# Patient Record
Sex: MALE | Race: WHITE | URBAN - METROPOLITAN AREA
[De-identification: names, ages, dates, MRNs, and addresses within clinical notes are randomized per-mention and may not be internally consistent; named-entity substitution may affect disease eponyms.]

---

## 2017-08-02 ENCOUNTER — HOSPITAL ENCOUNTER (EMERGENCY)
Facility: CLINIC | Age: 37
Discharge: HOME | End: 2017-08-02
Attending: EMERGENCY MEDICINE | Admitting: EMERGENCY MEDICINE
Payer: COMMERCIAL

## 2017-08-02 VITALS
SYSTOLIC BLOOD PRESSURE: 145 MMHG | BODY MASS INDEX: 33.04 KG/M2 | WEIGHT: 236 LBS | TEMPERATURE: 98.4 F | RESPIRATION RATE: 16 BRPM | OXYGEN SATURATION: 99 % | HEIGHT: 71 IN | DIASTOLIC BLOOD PRESSURE: 86 MMHG | HEART RATE: 74 BPM

## 2017-08-02 DIAGNOSIS — L03.119 CELLULITIS AND ABSCESS OF LEG, EXCEPT FOOT: ICD-10-CM

## 2017-08-02 DIAGNOSIS — L02.419 CELLULITIS AND ABSCESS OF LEG, EXCEPT FOOT: ICD-10-CM

## 2017-08-02 PROCEDURE — 10060 I&D ABSCESS SIMPLE/SINGLE: CPT

## 2017-08-02 PROCEDURE — 87186 SC STD MICRODIL/AGAR DIL: CPT | Performed by: EMERGENCY MEDICINE

## 2017-08-02 PROCEDURE — 87077 CULTURE AEROBIC IDENTIFY: CPT | Performed by: EMERGENCY MEDICINE

## 2017-08-02 PROCEDURE — 87070 CULTURE OTHR SPECIMN AEROBIC: CPT | Performed by: EMERGENCY MEDICINE

## 2017-08-02 PROCEDURE — 99283 EMERGENCY DEPT VISIT LOW MDM: CPT | Mod: 25

## 2017-08-02 RX ORDER — CEPHALEXIN 500 MG/1
500 CAPSULE ORAL 4 TIMES DAILY
Qty: 28 CAPSULE | Refills: 0 | Status: SHIPPED | OUTPATIENT
Start: 2017-08-02 | End: 2017-08-09

## 2017-08-02 ASSESSMENT — ENCOUNTER SYMPTOMS
CHILLS: 0
COLOR CHANGE: 1
FEVER: 0

## 2017-08-02 NOTE — ED AVS SNAPSHOT
Emergency Department    [address] [place] [address]    Phone:  [phone]    Fax:  [phone]                                       Dwight Tompkins   MRN: [id number]    Department:   Emergency Department   Date of Visit:  8/2/2017           After Visit Summary Signature Page     I have received my discharge instructions, and my questions have been answered. I have discussed any challenges I see with this plan with the nurse or doctor.    ..........................................................................................................................................  Patient/Patient Representative Signature      ..........................................................................................................................................  Patient Representative Print Name and Relationship to Patient    ..................................................               ................................................  Date                                            Time    ..........................................................................................................................................  Reviewed by Signature/Title    ...................................................              ..............................................  Date                                                            Time

## 2017-08-02 NOTE — ED AVS SNAPSHOT
Emergency Department    [address] [place] [address]    Phone:  [phone]    Fax:  [phone]                                       Dwight Tompkins   MRN: [id number]    Department:   Emergency Department   Date of Visit:  8/2/2017           Patient Information     Date Of Birth          1980        Your diagnoses for this visit were:     Cellulitis and abscess of leg, except foot        You were seen by Gilmar Guerrero MD.      Follow-up Information     Follow up with your doctor.        Discharge Instructions         Abscess (Incision & Drainage)  An abscess is sometimes called a boil. It happens when bacteria get trapped under the skin and start to grow. Pus forms inside the abscess as the body responds to the bacteria. An abscess can happen with an insect bite, ingrown hair, blocked oil gland, pimple, cyst, or puncture wound.  Your healthcare provider has drained the pus from your abscess. If the abscess pocket was large, your healthcare provider may have put in gauze packing. Your provider will need to remove it on your next visit. He or she may also replace it at that time. You may not need antibiotics to treat a simple abscess, unless the infection is spreading into the skin around the wound (cellulitis).  The wound will take about 1 to 2 weeks to heal, depending on the size of the abscess. Healthy tissue will grow from the bottom and sides of the opening until it seals over.  Home care  These tips can help your wound heal:    The wound may drain for the first 2 days. Cover the wound with a clean dry dressing. Change the dressing if it becomes soaked with blood or pus.    If a gauze packing was placed inside the abscess pocket, you may be told to remove it yourself. You may do this in the shower. Once the packing is removed, you should wash the area in the shower, or clean the area as directed by your provider. Continue to do this until the skin opening has closed. Make  sure you wash your hands after changing the packing or cleaning the wound.    If you were prescribed antibiotics, take them as directed until they are all gone.    You may use acetaminophen or ibuprofen to control pain, unless another pain medicine was prescribed. If you have liver disease or ever had a stomach ulcer, talk with your doctor before using these medicines.  Follow-up care  Follow up with your healthcare provider, or as advised. If a gauze packing was put in your wound, it should be removed in 1 to 2 days. Check your wound every day for any signs that the infection is getting worse. The signs are listed below.  When to seek medical advice  Call your healthcare provider right away if any of these occur:    Increasing redness or swelling    Red streaks in the skin leading away from the wound    Increasing local pain or swelling    Continued pus draining from the wound 2 days after treatment    Fever of 100.4 F (38 C) or higher, or as directed by your healthcare provider    Boil returns when you are at home  Date Last Reviewed: 9/1/2016 2000-2017 [employer]. [address] [place] [address]. All rights reserved. This information is not intended as a substitute for professional medical care. Always follow your healthcare professional's instructions.          24 Hour Appointment Hotline       To make an appointment at any [facility], call [phone] ([phone]). If you don't have a family doctor or clinic, we will help you find one. [facility] clinics are conveniently located to serve the needs of you and your family.             Review of your medicines      Notice     You have not been prescribed any medications.            Procedures and tests performed during your visit     Wound Culture Aerobic Bacterial      Orders Needing Specimen Collection     None      Pending Results     Date and Time Order Name Status Description    8/2/2017 0322 Wound Culture Aerobic  Bacterial In process             Pending Culture Results     Date and Time Order Name Status Description    8/2/2017 0322 Wound Culture Aerobic Bacterial In process             Pending Results Instructions     If you had any lab results that were not finalized at the time of your Discharge, you can call the ED Lab Result RN at [phone]. You will be contacted by this team for any positive Lab results or changes in treatment. The nurses are available 7 days a week from 10A to 6:30P.  You can leave a message 24 hours per day and they will return your call.        Test Results From Your Hospital Stay        8/2/2017  3:29 AM                Clinical Quality Measure: Blood Pressure Screening     Your blood pressure was checked while you were in the emergency department today. The last reading we obtained was  BP: 145/86 . Please read the guidelines below about what these numbers mean and what you should do about them.  If your systolic blood pressure (the top number) is less than 120 and your diastolic blood pressure (the bottom number) is less than 80, then your blood pressure is normal. There is nothing more that you need to do about it.  If your systolic blood pressure (the top number) is 120-139 or your diastolic blood pressure (the bottom number) is 80-89, your blood pressure may be higher than it should be. You should have your blood pressure rechecked within a year by a primary care provider.  If your systolic blood pressure (the top number) is 140 or greater or your diastolic blood pressure (the bottom number) is 90 or greater, you may have high blood pressure. High blood pressure is treatable, but if left untreated over time it can put you at risk for heart attack, stroke, or kidney failure. You should have your blood pressure rechecked by a primary care provider within the next 4 weeks.  If your provider in the emergency department today gave you specific instructions to follow-up with your doctor or  "provider even sooner than that, you should follow that instruction and not wait for up to 4 weeks for your follow-up visit.        Thank you for choosing [facility]       Thank you for choosing [facility] for your care. Our goal is always to provide you with excellent care. Hearing back from our patients is one way we can continue to improve our services. Please take a few minutes to complete the written survey that you may receive in the mail after you visit with us. Thank you!        [employer] Information     [employer] lets you send messages to your doctor, view your test results, renew your prescriptions, schedule appointments and more. To sign up, go to [email or web address] . Click on \"Log in\" on the left side of the screen, which will take you to the Welcome page. Then click on \"Sign up Now\" on the right side of the page.     You will be asked to enter the access code listed below, as well as some personal information. Please follow the directions to create your username and password.     Your access code is: [email or web address]  Expires: 10/31/2017  3:29 AM     Your access code will  in 90 days. If you need help or a new code, please call your [facility] clinic or [phone].        Care EveryWhere ID     This is your Care EveryWhere ID. This could be used by other organizations to access your [facility] medical records  [id number]        Equal Access to Services     FAUSTINO GILMORE AH: Hadii dago Antoine, waaxda luqadaha, qaybta kaalmada elinor, lala santamaria . So [facility] [phone].    ATENCIÓN: Si habla español, tiene a nelson disposición servicios gratuitos de asistencia lingüística. Nakul al [phone].    We comply with applicable federal civil rights laws and [place] laws. We do not discriminate on the basis of race, color, national origin, age, disability sex, sexual orientation or gender identity.            After Visit Summary       This is your record. Keep this with you " and show to your community pharmacist(s) and doctor(s) at your next visit.

## 2017-08-02 NOTE — LETTER
To Whom it may concern:      Dwight Tompkins was seen in our Emergency Department today, 08/02/17.  I expect his condition to improve over the next 2 days.  he may return to work/school when improved.    Sincerely,  Gilmar Guerrero MD

## 2017-08-02 NOTE — DISCHARGE INSTRUCTIONS
Abscess (Incision & Drainage)  An abscess is sometimes called a boil. It happens when bacteria get trapped under the skin and start to grow. Pus forms inside the abscess as the body responds to the bacteria. An abscess can happen with an insect bite, ingrown hair, blocked oil gland, pimple, cyst, or puncture wound.  Your healthcare provider has drained the pus from your abscess. If the abscess pocket was large, your healthcare provider may have put in gauze packing. Your provider will need to remove it on your next visit. He or she may also replace it at that time. You may not need antibiotics to treat a simple abscess, unless the infection is spreading into the skin around the wound (cellulitis).  The wound will take about 1 to 2 weeks to heal, depending on the size of the abscess. Healthy tissue will grow from the bottom and sides of the opening until it seals over.  Home care  These tips can help your wound heal:    The wound may drain for the first 2 days. Cover the wound with a clean dry dressing. Change the dressing if it becomes soaked with blood or pus.    If a gauze packing was placed inside the abscess pocket, you may be told to remove it yourself. You may do this in the shower. Once the packing is removed, you should wash the area in the shower, or clean the area as directed by your provider. Continue to do this until the skin opening has closed. Make sure you wash your hands after changing the packing or cleaning the wound.    If you were prescribed antibiotics, take them as directed until they are all gone.    You may use acetaminophen or ibuprofen to control pain, unless another pain medicine was prescribed. If you have liver disease or ever had a stomach ulcer, talk with your doctor before using these medicines.  Follow-up care  Follow up with your healthcare provider, or as advised. If a gauze packing was put in your wound, it should be removed in 1 to 2 days. Check your wound every day for any  signs that the infection is getting worse. The signs are listed below.  When to seek medical advice  Call your healthcare provider right away if any of these occur:    Increasing redness or swelling    Red streaks in the skin leading away from the wound    Increasing local pain or swelling    Continued pus draining from the wound 2 days after treatment    Fever of 100.4 F (38 C) or higher, or as directed by your healthcare provider    Boil returns when you are at home  Date Last Reviewed: 9/1/2016 2000-2017 [employer]. [address] [place] [address], [place] [id number]. All rights reserved. This information is not intended as a substitute for professional medical care. Always follow your healthcare professional's instructions.

## 2017-08-02 NOTE — ED PROVIDER NOTES
"  History     Chief Complaint:  Wound Check (wound to right buttocks, back of left calf. says started as white head or infected hair folicle. was seen at urgent care Monday after work and given antibiotics at [facility]. given bactrim and bactroban ointment. says pain is getting worse and he cant sleep)    MARY BETH   Dwight Tompkins is a 37 year old male who presents for a wound check. The patient reports that he was seen at urgent care on Monday and put on antibiotics for two abscesses that began as white heads or infected hair follicles on his right buttock and behind his left knee. He was given bactrim and Bactroban ointment. He states that his pain is getting worse, not better, and that he cannot sleep due to the pain. The patient does have a history of similar abscesses that have had to be drained in the past.    Allergies:  No Known Drug Allergies    Medications:    The patient is not currently taking any prescribed medications.    Past Medical History:    The patient denies any relevant past medical history.    Past Surgical History:    History reviewed. No pertinent past surgical history.    Family History:    The patient denies any relevant family medical history.    Social History:  Smoking Status: No  Smokeless Tobacco: No  Alcohol Use: No  Marital Status:  N/A     Review of Systems   Constitutional: Negative for chills and fever.   Skin: Positive for color change.        Abscess to the right buttock and behind the left knee   All other systems reviewed and are negative.    Physical Exam   Vitals:  Patient Vitals for the past 24 hrs:   BP Temp Temp src Pulse Resp SpO2 Height Weight   08/02/17 0215 145/86 98.4  F (36.9  C) Oral 74 16 99 % 1.803 m (5' 11\") 107 kg (236 lb)      Physical Exam  Constitutional: The patient is oriented to person, place, and time.   HENT:   Head: Atraumatic  Right Ear: Normal  Left Ear: Normal  Nose: Nose normal.   Mouth/Throat: Oropharynx is clear and moist. No erythema or " exudate.   Eyes: Conjunctivae and EOM are normal. Pupils are equal, round, and reactive to light. No discharge  Neck: Normal range of motion. Neck supple.   Cardiovascular: Normal rate, regular rhythm, no murmur gallops or rubs. Intact distal pulses.    Pulmonary/Chest: CTA bilaterally. No wheezes rale or rhonchi.  Abdominal: Soft. Non tender.  No masses   Musculoskeletal: No edema. No bony deformity. Normal range of motion  Lymphadenopathy:     The patient has no cervical adenopathy.   Neurological: The patient is alert and oriented to person, place, and time. The patient has normal strength and normal reflexes. No cranial nerve deficit. Coordination normal.  Skin: Skin is warm and dry. No rash noted. The patient is not diaphoretic. 6 cm area of erythema and induration on lateral right hip, no active drainage, tender with palpitation. 2 cm area of erythema and induration on back of left calf, no fluctuance, minimally tender  Psychiatric: The patient has a normal mood and affect.    Emergency Department Course     Laboratory:  Laboratory findings were communicated with the patient who voiced understanding of the findings.  Wound culture aerobic bacterial: Pending    Procedures:      Procedure: Incision and Drainage     LOCATIONS:  Right buttock    ANESTHESIA:  Local field block using and Marcaine 0.5% with epinephrine, total of 3 mLs    PREPARATION:  Cleansed with Normal Saline    PROCEDURE:  Area was incised with # 11 Blade ([place]) with a Single Straight incision.  Wound treatment included Deloculation, purulent drainage.  Packing consisted of Plain Gauze.  Appropriate dressing was applied to cover the area.    Patient Status: Patient tolerated the procedure well. There were no complications.      Emergency Department Course:  Nursing notes and vitals reviewed.  0240 I had my initial encounter with the patient.  I performed an exam of the patient as documented above.     I discussed the treatment plan with  the patient. They expressed understanding of this plan and consented to discharge. They will be discharged home with instructions for care and follow up. In addition, the patient will return to the emergency department if their symptoms persist, worsen, if new symptoms arise or if there is any concern.  All questions were answered.    I personally reviewed the laboratory results with the Patient and answered all related questions prior to discharge.    Impression & Plan      Medical Decision Making:  Dwight Tompkins is a 37 year old male who presents to the emergency department today with concerns about swelling wound on his right hip and a secondary wound on the back of his left calf The patient has a large, approximately 6 cm diameter of erythema and induration on his right hip consistent with abscess. He has a smaller about 2 cm diameter of induration on the back of his let calf without any fluctuant or evidence of jada abscess. Note the patient was seen at Urgent care a day ago and started on Bactrim which he has had a total of 3 doses but thinks things are getting worse. In light of this I thought an incision and drainage was indicated as per the above procedure note. Drained a small amount of purulent material. Loculations were broken up with forceps and a quarter inch packing was applied. I will ad Keflex to his antibiotic regimen and have recommended 3x days Bactroban ointment and warm packs. The spot on the back of his left I did not feel represented a jada abscess and did not need to be drained at this time, but should continue the antibiotic ointment and warm pack unless things change. He should return for problems.    Diagnosis:    ICD-10-CM    1. Cellulitis and abscess of leg, except foot L03.119 Wound Culture Aerobic Bacterial    L02.419      Disposition:   Discharge    Discharge Medications:  New Prescriptions    CEPHALEXIN (KEFLEX) 500 MG CAPSULE    Take 1 capsule (500 mg) by mouth 4 times daily  for 7 days     Scribe Disclosure:  I, Tao England, am serving as a scribe at 2:44 AM on 8/2/2017 to document services personally performed by Gilmar Guerrero MD, based on my observations and the provider's statements to me.    8/2/2017    EMERGENCY DEPARTMENT       Gilmar Guerrero MD  08/02/17 0654

## 2017-08-04 ENCOUNTER — TELEPHONE (OUTPATIENT)
Dept: EMERGENCY MEDICINE | Facility: CLINIC | Age: 37
End: 2017-08-04

## 2017-08-04 DIAGNOSIS — A49.02 MRSA INFECTION: ICD-10-CM

## 2017-08-04 LAB
BACTERIA SPEC CULT: ABNORMAL
Lab: ABNORMAL
MICRO REPORT STATUS: ABNORMAL
MICROORGANISM SPEC CULT: ABNORMAL
SPECIMEN SOURCE: ABNORMAL

## 2017-08-04 RX ORDER — DOXYCYCLINE 100 MG/1
100 CAPSULE ORAL 2 TIMES DAILY
Qty: 20 CAPSULE | Refills: 0 | Status: SHIPPED | OUTPATIENT
Start: 2017-08-04 | End: 2017-08-14

## 2017-08-04 NOTE — TELEPHONE ENCOUNTER
[facility] Emergency Department Lab result notification [Adult-Male]    [facility] ED lab result protocol used  General Culture    Reason for call  Notify of lab results, assess symptoms,  review ED providers recommendations/discharge instructions (if necessary) and advise per ED lab result f/u protocol    Lab Result (including Rx patient on, if applicable)  Final Wound culture report on 8/4/17  [place] ED discharge antibiotic: Cephalexin (Keflex) 500 mg capsule, Take 1 capsule (500 mg) by mouth 4 times daily for 7 days in addition to Bactroban and Bactrim started in  on 7/31/17.  #1. Bacteria,Heavy growth Methicillin resistant Staphylococcus aureus (MRSA), which is [RESISTANT] to ED discharge antibiotics.  Incision and Drainage performed in [facility] ED [Yes / No]: Yes  Patient to be notified of result, symptoms's assessed and advised per [place] ED lab result protocol.    Information table from ED Provider visit on 8/2/17  ED diagnosis:   Cellulitis and abscess of leg, except foot    ED provider   Gilmar Guerrero MD    Symptoms reported at ED visit (Chief complaint, HPI) Wound Check (wound to right buttocks, back of left calf. says started as white head or infected hair folicle. was seen at urgent care Monday after work and given antibiotics at [facility]. given bactrim and bactroban ointment. says pain is getting worse and he cant sleep)     HPI   Dwight Tompkins is a 37 year old male who presents for a wound check. The patient reports that he was seen at urgent care on Monday and put on antibiotics for two abscesses that began as white heads or infected hair follicles on his right buttock and behind his left knee. He was given bactrim and Bactroban ointment. He states that his pain is getting worse, not better, and that he cannot sleep due to the pain. The patient does have a history of similar abscesses that have had to be drained in the past.      ED providers Impression and Plan  "(applicable information) Dwight Tompkins is a 37 year old male who presents to the emergency department today with concerns about swelling wound on his right hip and a secondary wound on the back of his left calf The patient has a large, approximately 6 cm diameter of erythema and induration on his right hip consistent with abscess. He has a smaller about 2 cm diameter of induration on the back of his let calf without any fluctuant or evidence of jada abscess. Note the patient was seen at Urgent care a day ago and started on Bactrim which he has had a total of 3 doses but thinks things are getting worse. In light of this I thought an incision and drainage was indicated as per the above procedure note. Drained a small amount of purulent material. Loculations were broken up with forceps and a quarter inch packing was applied. I will ad Keflex to his antibiotic regimen and have recommended 3x days Bactroban ointment and warm packs. The spot on the back of his left I did not feel represented a jada abscess and did not need to be drained at this time, but should continue the antibiotic ointment and warm pack unless things change. He should return for problems.      Significant Medical hx, if applicable None   Coumadin/Warfarin [Yes or No] No   Creatinine Level (mg/dl) Not available   Creatinine clearance (ml/min), if applicable Not available   Allergies Cats   Weight, if applicable 107 Kg      RN Assessment (Patient s current Symptoms), include time called.  [Insert Left message here if message left]  Msg left at 11:26 am.  Wound \"seems to be okay\", had \"alot of drainage\" with \"slimy pussy\" drainage.  Pain decreased.  Left knee is \"clearing up pretty good\".   Reviewed information about MRSA including infection control measures related to MRSA.   Questions answered.      RN Recommendations/Instructions per [place] ED lab result protocol  Patient notified of lab result and treatment recommendations.  Rx for Doxycycline " sent to [Pharmacy - [facility]].  RN reviewed information about stopping Bactrim but continuing Bactroban ointment and Keflex.    [facility] Provider Name & Recommendations (included time consulted)  Did discuss with Dr. Ortiz in [facility].  Verbal order received for Doxycycline 100 mg PO BID x 10 days.     Please Contact your PCP clinic or return to the Emergency department if your:    Symptoms return.    Symptoms do not improve after 3 days on antibiotic.    Symptoms do not resolve after completing antibiotic.    Symptoms worsen or other concerning symptom's.    PCP follow-up Questions asked: NO    Alexa Rodgers RN    [facility] RN  Lung Nodule and ED Lab Results F/U RN  Epic pool (ED late result f/u RN) : [id number]   # [phone]     Copy of Lab result   Order   Wound Culture Aerobic Bacterial [[id number]] (Order [id number])   Exam Information   Exam Date Exam Time Accession # Results    8/2/17  3:22 AM [id number]    Component Results   Component Collected Lab   Specimen Description 08/02/2017  3:22 AM 75   Right Hip Wound   Special Requests 08/02/2017  3:22 AM 75   Specimen collected in eSwab transport (white cap)   Culture Micro (Abnormal) 08/02/2017  3:22 AM 75   Heavy growth Methicillin resistant Staphylococcus aureus (MRSA)   Micro Report Status 08/02/2017  3:22 AM 75   FINAL 08/04/2017   Organism: 08/02/2017  3:22 AM 75   Heavy growth Methicillin resistant Staphylococcus aureus (MRSA)   Culture & Susceptibility   HEAVY GROWTH METHICILLIN RESISTANT STAPHYLOCOCCUS AUREUS (MRSA) (KEIKO)   Antibiotic Sensitivity Unit Status   CIPROFLOXACIN <=0.5 Susceptible ug/mL Final   CLINDAMYCIN <=0.25 Susceptible ug/mL Final   ERYTHROMYCIN 0.5 Susceptible ug/mL Final   GENTAMICIN <=0.5 Susceptible ug/mL Final   LEVOFLOXACIN 0.25 Susceptible ug/mL Final   LINEZOLID 2 Susceptible ug/mL Final   OXACILLIN >=4 Resistant ug/mL Final   PENICILLIN >=0.5 Resistant ug/mL Final   TETRACYCLINE <=1 Susceptible  ug/mL Final   Trimethoprim/Sulfa >=16/304 Resistant ug/mL Final   VANCOMYCIN <=0.5 Susceptible ug/mL Final